# Patient Record
Sex: MALE | Race: BLACK OR AFRICAN AMERICAN | NOT HISPANIC OR LATINO | Employment: UNEMPLOYED | URBAN - METROPOLITAN AREA
[De-identification: names, ages, dates, MRNs, and addresses within clinical notes are randomized per-mention and may not be internally consistent; named-entity substitution may affect disease eponyms.]

---

## 2018-12-31 ENCOUNTER — HOSPITAL ENCOUNTER (EMERGENCY)
Facility: HOSPITAL | Age: 33
Discharge: HOME | End: 2018-12-31
Attending: FAMILY MEDICINE

## 2018-12-31 VITALS
TEMPERATURE: 99 F | OXYGEN SATURATION: 100 % | BODY MASS INDEX: 22.07 KG/M2 | RESPIRATION RATE: 15 BRPM | SYSTOLIC BLOOD PRESSURE: 116 MMHG | WEIGHT: 143 LBS | HEART RATE: 67 BPM | DIASTOLIC BLOOD PRESSURE: 81 MMHG

## 2018-12-31 DIAGNOSIS — R51.9 INTRACTABLE HEADACHE, UNSPECIFIED CHRONICITY PATTERN, UNSPECIFIED HEADACHE TYPE: Primary | ICD-10-CM

## 2018-12-31 DIAGNOSIS — E86.0 MILD DEHYDRATION: ICD-10-CM

## 2018-12-31 LAB
ALBUMIN SERPL BCP-MCNC: 3.8 G/DL
ALP SERPL-CCNC: 54 U/L
ALT SERPL W/O P-5'-P-CCNC: 13 U/L
ANION GAP SERPL CALC-SCNC: 8 MMOL/L
AST SERPL-CCNC: 22 U/L
BACTERIA #/AREA URNS AUTO: ABNORMAL /HPF
BASOPHILS # BLD AUTO: 0.01 K/UL
BASOPHILS NFR BLD: 0.2 %
BILIRUB SERPL-MCNC: 0.9 MG/DL
BILIRUB UR QL STRIP: NEGATIVE
BUN SERPL-MCNC: 11 MG/DL
CALCIUM SERPL-MCNC: 8.9 MG/DL
CHLORIDE SERPL-SCNC: 100 MMOL/L
CLARITY UR REFRACT.AUTO: CLEAR
CO2 SERPL-SCNC: 31 MMOL/L
COLOR UR AUTO: YELLOW
CREAT SERPL-MCNC: 1.32 MG/DL
DIFFERENTIAL METHOD: ABNORMAL
EOSINOPHIL # BLD AUTO: 0 K/UL
EOSINOPHIL NFR BLD: 0.2 %
ERYTHROCYTE [DISTWIDTH] IN BLOOD BY AUTOMATED COUNT: 14.8 %
EST. GFR  (AFRICAN AMERICAN): >60 ML/MIN/1.73 M^2
EST. GFR  (NON AFRICAN AMERICAN): >60 ML/MIN/1.73 M^2
FLUAV AG SPEC QL IA: NEGATIVE
FLUBV AG SPEC QL IA: NEGATIVE
GLUCOSE SERPL-MCNC: 105 MG/DL
GLUCOSE UR QL STRIP: NEGATIVE
HCT VFR BLD AUTO: 39.1 %
HGB BLD-MCNC: 14 G/DL
HGB UR QL STRIP: ABNORMAL
HYALINE CASTS UR QL AUTO: 2 /LPF
KETONES UR QL STRIP: NEGATIVE
LEUKOCYTE ESTERASE UR QL STRIP: NEGATIVE
LYMPHOCYTES # BLD AUTO: 2 K/UL
LYMPHOCYTES NFR BLD: 40.4 %
MCH RBC QN AUTO: 27.3 PG
MCHC RBC AUTO-ENTMCNC: 35.8 G/DL
MCV RBC AUTO: 76 FL
MICROSCOPIC COMMENT: ABNORMAL
MONOCYTES # BLD AUTO: 0.9 K/UL
MONOCYTES NFR BLD: 17.2 %
NEUTROPHILS # BLD AUTO: 2.1 K/UL
NEUTROPHILS NFR BLD: 41.8 %
NITRITE UR QL STRIP: NEGATIVE
PH UR STRIP: 6 [PH] (ref 5–8)
PLATELET # BLD AUTO: 198 K/UL
PMV BLD AUTO: 9.1 FL
POCT GLUCOSE: 127 MG/DL (ref 70–110)
POTASSIUM SERPL-SCNC: 3.5 MMOL/L
PROT SERPL-MCNC: 7.2 G/DL
PROT UR QL STRIP: ABNORMAL
RBC # BLD AUTO: 5.13 M/UL
RBC #/AREA URNS AUTO: 3 /HPF (ref 0–4)
SODIUM SERPL-SCNC: 139 MMOL/L
SP GR UR STRIP: 1.02 (ref 1–1.03)
SPECIMEN SOURCE: NORMAL
URN SPEC COLLECT METH UR: ABNORMAL
UROBILINOGEN UR STRIP-ACNC: NEGATIVE EU/DL
WBC # BLD AUTO: 4.95 K/UL
WBC #/AREA URNS AUTO: 3 /HPF (ref 0–5)

## 2018-12-31 PROCEDURE — 81000 URINALYSIS NONAUTO W/SCOPE: CPT

## 2018-12-31 PROCEDURE — 63600175 PHARM REV CODE 636 W HCPCS: Performed by: PHYSICIAN ASSISTANT

## 2018-12-31 PROCEDURE — 85025 COMPLETE CBC W/AUTO DIFF WBC: CPT

## 2018-12-31 PROCEDURE — 96365 THER/PROPH/DIAG IV INF INIT: CPT

## 2018-12-31 PROCEDURE — 82962 GLUCOSE BLOOD TEST: CPT

## 2018-12-31 PROCEDURE — 96361 HYDRATE IV INFUSION ADD-ON: CPT

## 2018-12-31 PROCEDURE — 87400 INFLUENZA A/B EACH AG IA: CPT

## 2018-12-31 PROCEDURE — 99285 EMERGENCY DEPT VISIT HI MDM: CPT | Mod: 25

## 2018-12-31 PROCEDURE — 80053 COMPREHEN METABOLIC PANEL: CPT

## 2018-12-31 PROCEDURE — 96375 TX/PRO/DX INJ NEW DRUG ADDON: CPT

## 2018-12-31 PROCEDURE — 25000003 PHARM REV CODE 250: Performed by: PHYSICIAN ASSISTANT

## 2018-12-31 RX ORDER — DIPHENHYDRAMINE HYDROCHLORIDE 50 MG/ML
25 INJECTION INTRAMUSCULAR; INTRAVENOUS
Status: COMPLETED | OUTPATIENT
Start: 2018-12-31 | End: 2018-12-31

## 2018-12-31 RX ORDER — KETOROLAC TROMETHAMINE 30 MG/ML
15 INJECTION, SOLUTION INTRAMUSCULAR; INTRAVENOUS
Status: COMPLETED | OUTPATIENT
Start: 2018-12-31 | End: 2018-12-31

## 2018-12-31 RX ORDER — BUTALBITAL, ACETAMINOPHEN AND CAFFEINE 50; 325; 40 MG/1; MG/1; MG/1
1 TABLET ORAL EVERY 6 HOURS PRN
Qty: 5 TABLET | Refills: 0 | Status: SHIPPED | OUTPATIENT
Start: 2018-12-31 | End: 2019-01-30

## 2018-12-31 RX ORDER — CETIRIZINE HYDROCHLORIDE 10 MG/1
10 TABLET ORAL DAILY
Qty: 30 TABLET | Refills: 0 | Status: SHIPPED | OUTPATIENT
Start: 2018-12-31 | End: 2019-12-31

## 2018-12-31 RX ADMIN — DIPHENHYDRAMINE HYDROCHLORIDE 25 MG: 50 INJECTION, SOLUTION INTRAMUSCULAR; INTRAVENOUS at 02:12

## 2018-12-31 RX ADMIN — KETOROLAC TROMETHAMINE 15 MG: 30 INJECTION, SOLUTION INTRAMUSCULAR at 02:12

## 2018-12-31 RX ADMIN — PROMETHAZINE HYDROCHLORIDE 12.5 MG: 25 INJECTION INTRAMUSCULAR; INTRAVENOUS at 02:12

## 2018-12-31 RX ADMIN — SODIUM CHLORIDE 1000 ML: 0.9 INJECTION, SOLUTION INTRAVENOUS at 02:12

## 2018-12-31 NOTE — ED NOTES
Pt stated she is starting to feel better, feel a little woozy from the medication. Will continue to monitor.

## 2018-12-31 NOTE — ED NOTES
"Severe headache,   " I think I may be dehydrated." pt has dark colored yellow urine and is not able to keep his eyes open at this time due to severe headache.  "

## 2019-01-01 NOTE — ED PROVIDER NOTES
Encounter Date: 2018       History     Chief Complaint   Patient presents with    Headache     HA, Weakness, and back pain that atsrated 2 days ago. I thought maybe it was something i ate bc the pasta didnt spell right i ate vandana day before that on Friday. No Vomiting or diarrha. I have had viral menigitis twice as a baby and as an 10 year old.     Back Pain    Weakness     Chinedu George 33 y.o. afebrile male with history of viral meningitis as a child however otherwise presented to the ED with c/o general illness complaints. Patient reports that he was around multiple people Friday as he had to attend a .  He states Saturday he began with some malaise, generalized weakness and gradual onset of headache. He does report that headache is located primarily to the right frontal in orbital regions.  He describes it as a pressure sensation.  He does also report associated nasal congestion and sinus pressure.  He states since this time he has had onset of generalized body aches with prevalence to the entire back.  Patient denies any recent falls or trauma. He states that he has had some decreased appetite and hence has had decreased p.o. intake including liquids.  He reports that he feels somewhat dehydrated and is then did some yellowing of the urine.  He denies any fever, vision changes, neck rigidity, cough, chest pain, vomiting, diarrhea, rash or  symptoms. He has not tried any medications for the symptoms.       The history is provided by the patient.     Review of patient's allergies indicates:  No Known Allergies  Past Medical History:   Diagnosis Date    Viral meningitis      History reviewed. No pertinent surgical history.  History reviewed. No pertinent family history.  Social History     Tobacco Use    Smoking status: Light Tobacco Smoker     Packs/day: 0.25     Types: Cigarettes    Smokeless tobacco: Never Used   Substance Use Topics    Alcohol use: Yes     Comment: socially     Drug use:  No     Review of Systems   Constitutional: Positive for appetite change. Negative for fever.   HENT: Positive for congestion, sinus pressure and sinus pain. Negative for sore throat.    Eyes: Negative for photophobia and visual disturbance.   Respiratory: Negative for shortness of breath.    Cardiovascular: Negative for chest pain.   Gastrointestinal: Negative for abdominal pain, diarrhea, nausea and vomiting.   Genitourinary: Negative for discharge, dysuria, flank pain, frequency, hematuria, penile pain, penile swelling, scrotal swelling and testicular pain.   Musculoskeletal: Positive for arthralgias (generalized body aches) and back pain. Negative for neck pain and neck stiffness.   Skin: Negative for rash.   Neurological: Positive for weakness and headaches. Negative for dizziness and light-headedness.   Hematological: Does not bruise/bleed easily.   Psychiatric/Behavioral: Negative for confusion.       Physical Exam     Initial Vitals [12/31/18 1401]   BP Pulse Resp Temp SpO2   123/88 93 15 98.7 °F (37.1 °C) 98 %      MAP       --         Vitals:    12/31/18 1614   BP: 116/81   Pulse: 67   Resp:    Temp:        Physical Exam    Nursing note and vitals reviewed.  Constitutional: Vital signs are normal. He appears well-developed and well-nourished. He is cooperative.  Non-toxic appearance. He does not appear ill. No distress.   HENT:   Head: Normocephalic and atraumatic.   Nose: Mucosal edema present. Right sinus exhibits maxillary sinus tenderness and frontal sinus tenderness. Left sinus exhibits maxillary sinus tenderness and frontal sinus tenderness.   Mouth/Throat: Mucous membranes are dry.   Eyes: Conjunctivae and lids are normal.   Neck: Trachea normal, normal range of motion and full passive range of motion without pain. Neck supple. No stridor present. No spinous process tenderness and no muscular tenderness present. No Kernig's sign noted.   Cardiovascular: Normal rate and regular rhythm.    Pulmonary/Chest: Breath sounds normal. No respiratory distress. He has no wheezes. He has no rhonchi.   Abdominal: Soft. Normal appearance. There is no tenderness. There is no rebound and no guarding.   Musculoskeletal: Normal range of motion. He exhibits no edema or tenderness.   Lymphadenopathy:     He has no cervical adenopathy.   Neurological: He is alert and oriented to person, place, and time. He has normal strength. No cranial nerve deficit or sensory deficit. Gait normal. GCS eye subscore is 4. GCS verbal subscore is 5. GCS motor subscore is 6.   Skin: Skin is warm, dry and intact. No rash noted.   Psychiatric: He has a normal mood and affect. His speech is normal and behavior is normal. Thought content normal.         ED Course   Procedures  Labs Reviewed   CBC W/ AUTO DIFFERENTIAL - Abnormal; Notable for the following components:       Result Value    Hematocrit 39.1 (*)     MCV 76 (*)     RDW 14.8 (*)     MPV 9.1 (*)     Mono% 17.2 (*)     All other components within normal limits   COMPREHENSIVE METABOLIC PANEL - Abnormal; Notable for the following components:    CO2 31 (*)     All other components within normal limits   URINALYSIS, REFLEX TO URINE CULTURE - Abnormal; Notable for the following components:    Protein, UA 2+ (*)     Occult Blood UA 2+ (*)     All other components within normal limits    Narrative:     Preferred Collection Type->Urine, Clean Catch   URINALYSIS MICROSCOPIC - Abnormal; Notable for the following components:    Hyaline Casts, UA 2 (*)     All other components within normal limits    Narrative:     Preferred Collection Type->Urine, Clean Catch   POCT GLUCOSE - Abnormal; Notable for the following components:    POCT Glucose 127 (*)     All other components within normal limits   INFLUENZA A AND B ANTIGEN          Imaging Results    None     Chinedu George 33 y.o. afebrile male with history of viral meningitis as a child however otherwise presented to the ED with c/o general  illness complaints. Patient reports that he was around multiple people Friday as he had to attend a .  He states Saturday he began with some malaise, generalized weakness and gradual onset of headache. He does report that headache is located primarily to the right frontal in orbital regions.  He describes it as a pressure sensation.  He does also report associated nasal congestion and sinus pressure.  He states since this time he has had onset of generalized body aches with prevalence to the entire back.  Patient denies any recent falls or trauma. He states that he has had some decreased appetite and hence has had decreased p.o. intake including liquids.  He reports that he feels somewhat dehydrated and is then did some yellowing of the urine.  He denies any fever, vision changes, neck rigidity, cough, chest pain, vomiting, diarrhea, rash or  symptoms. He has not tried any medications for the symptoms.  ROS positive for URI symptoms.  Physical exam reveals patient that appears nontoxic in mild distress.  TM's with bilateral serous otitis media; nose with congestion and rhinorrhea. TTP over bilateral frontal sinuses. Oropharynx with mild erythema; no edema or exudate. PERRL, EOMs intact. Lungs clear and free of wheeze. Heart regular rate and rhythm. Abdomen is soft and nontender with normal bowel sounds. FROM of neck, no lymphadenopathy or meningeal signs and FROM of all extremities with strength 5/5 bilaterally. Skin free of rash, pallor and diaphoresis.    DDX: influenza, viral URI with cough, , UTI, electrolyte abnormality, anemia, dehydration, sinus headache, tension headache, meningitis    ED management: Flu swab negative. Patient's orthostatics were noted to be negative however pulse did change by 20 points consistent with some mild dehydration.  We will obtain labs to evaluate for general symptoms. Labs are grossly unremarkable.  No further labs or imaging warranted at this time as patient remains  well appearing, afebrile and in no distress at this time.  Patient did report complete resolution headache with IV fluids, Toradol, Phenergan and Benadryl in the ED.  Low suspicion of acute intracranial process or meningitis as Patient has no neck stiffness/meningismus, fever, elevated WBC count, elevated blood pressure, confusion, vision loss, temporal artery tenderness, rash, vomiting, photophobia or thunderclap onset to suggest pseudotumor cerebri, meningitis, tumor, ICH, temporal arteritis, or encephalitis.  Did encourage patient to increase oral hydration and to begin medications as directed as there does appear to be a component of sinus headache and believe patient's symptoms are more consistent with viral syndrome.       Impression/Plan: The primary encounter diagnosis was Intractable headache, unspecified chronicity pattern, unspecified headache type. A diagnosis of Mild dehydration was also pertinent to this visit. Discharged with Fioricet and Zyrtec. Patient will follow up with Primary.  Patient cautioned on when to return to ED including of any development of or worsening headache, fever or neck rigidity.  Pt. Understands and agrees with current treatment plan                                                 Clinical Impression:   The primary encounter diagnosis was Intractable headache, unspecified chronicity pattern, unspecified headache type. A diagnosis of Mild dehydration was also pertinent to this visit.                             SUGAR Moran  12/31/18 2034

## 2022-08-15 ENCOUNTER — HOSPITAL ENCOUNTER (EMERGENCY)
Facility: OTHER | Age: 37
Discharge: HOME | End: 2022-08-15
Attending: EMERGENCY MEDICINE

## 2022-08-15 VITALS
DIASTOLIC BLOOD PRESSURE: 95 MMHG | RESPIRATION RATE: 18 BRPM | HEIGHT: 68 IN | HEART RATE: 67 BPM | SYSTOLIC BLOOD PRESSURE: 163 MMHG | WEIGHT: 143 LBS | OXYGEN SATURATION: 98 % | BODY MASS INDEX: 21.67 KG/M2 | TEMPERATURE: 98 F

## 2022-08-15 DIAGNOSIS — R60.0 FACIAL EDEMA: Primary | ICD-10-CM

## 2022-08-15 DIAGNOSIS — K04.7 DENTAL INFECTION: ICD-10-CM

## 2022-08-15 PROCEDURE — 25000003 PHARM REV CODE 250: Performed by: EMERGENCY MEDICINE

## 2022-08-15 PROCEDURE — 99283 EMERGENCY DEPT VISIT LOW MDM: CPT

## 2022-08-15 RX ORDER — KETOROLAC TROMETHAMINE 10 MG/1
10 TABLET, FILM COATED ORAL
Status: COMPLETED | OUTPATIENT
Start: 2022-08-15 | End: 2022-08-15

## 2022-08-15 RX ORDER — OXYCODONE AND ACETAMINOPHEN 5; 325 MG/1; MG/1
1 TABLET ORAL EVERY 8 HOURS PRN
Qty: 6 TABLET | Refills: 0 | Status: SHIPPED | OUTPATIENT
Start: 2022-08-15 | End: 2022-08-17

## 2022-08-15 RX ADMIN — KETOROLAC TROMETHAMINE 10 MG: 10 TABLET, FILM COATED ORAL at 10:08

## 2022-08-16 NOTE — ED PROVIDER NOTES
Encounter Date: 8/15/2022    SCRIBE #1 NOTE: I, Radha Vieyra, am scribing for, and in the presence of, Elysia Evans MD.       History     Chief Complaint   Patient presents with    Dental Pain     Toothache since Friday night.    Oral Swelling     Pt does present with swelling noted to the right side of face.  Denies any sob and is speaking in complete sentenced without taking a pause; RR17 97%.     Time seen by provider: 9:25 PM    This is a 37 y.o. male with no significant PMHx, who presents with complaint of right upper dental pain that began 3 days ago. Pt also notes facial swelling. He was seen at [facility] 1 day ago and was prescribed Norco, naproxen, and Augmentin. He has only taken 2 doses of the Augmentin, and states that he only filled the Norco prescription, which he has been taking with no relief. He denies any fever, chills, body aches or trouble swallowing.     This is the extent of the patient's complaints at this time.    The history is provided by the patient.     Review of patient's allergies indicates:  No Known Allergies  Past Medical History:   Diagnosis Date    Viral meningitis      No past surgical history on file.  No family history on file.  Social History     Tobacco Use    Smoking status: Light Tobacco Smoker     Packs/day: 0.25     Types: Cigarettes    Smokeless tobacco: Never Used   Substance Use Topics    Alcohol use: Yes     Comment: socially     Drug use: No     Types: Marijuana     Review of Systems   Constitutional: Negative for chills and fever.   HENT: Positive for dental problem and facial swelling. Negative for sore throat and trouble swallowing.    Respiratory: Negative for shortness of breath.    Cardiovascular: Negative for chest pain.   Gastrointestinal: Negative for nausea.   Genitourinary: Negative for dysuria.   Musculoskeletal: Negative for back pain and myalgias.   Skin: Negative for rash.   Neurological: Negative for weakness.   Hematological:  Does not bruise/bleed easily.   All other systems reviewed and are negative.      Physical Exam     Initial Vitals [08/15/22 2014]   BP Pulse Resp Temp SpO2   (!) 176/99 68 17 98.9 °F (37.2 °C) 97 %      MAP       --         Physical Exam    Nursing note and vitals reviewed.  Constitutional: He appears well-developed and well-nourished. He is not diaphoretic. No distress.   HENT:   Head: Normocephalic and atraumatic.   Right sided facial edema. Right sided gingival edema. No sublingual or submandibular edema. Controlling secretions. No drainable fluid collection. Poor dentition with multiple fractured molars.    Eyes: EOM are normal.   Neck: Neck supple.   Normal range of motion.  Cardiovascular: Normal rate, regular rhythm and normal heart sounds. Exam reveals no gallop and no friction rub.    No murmur heard.  Pulmonary/Chest: Breath sounds normal. No respiratory distress. He has no wheezes. He has no rhonchi. He has no rales.   Musculoskeletal:         General: No tenderness or edema. Normal range of motion.      Cervical back: Normal range of motion and neck supple.     Neurological: He is alert and oriented to person, place, and time.   Skin: Skin is warm and dry.         ED Course   Procedures  Labs Reviewed - No data to display       Imaging Results    None          Medications   ketorolac tablet 10 mg (has no administration in time range)     Medical Decision Making:   History:   Old Medical Records: I decided to obtain old medical records.    Additional MDM:   Comments: 29-year-old male presents afebrile, hemodynamically stable well-appearing right-sided facial edema.  He is currently being treated for dental infection with Augmentin.  No evidence of Andres's angina.  No drainable dental abscess on exam.  I do not feel that this is treatment failure as he only has had 2 doses of Augmentin.  He was instructed to continue taking the Augmentin he was recently prescribed.  He states Norco did not help with his  pain, therefore, he will be given a prescription for Percocet x2 days.  He was instructed to not take the Norco while he is taking the Percocet.  In addition he was also given a prescription for naproxen at the outside facility which I encouraged him to fill.  He was instructed to follow-up with dentist for definitive management..        Scribe Attestation:   Scribe #1: I performed the above scribed service and the documentation accurately describes the services I performed. I attest to the accuracy of the note.              Physician Attestation for Scribe: I, Elysia Evans  , reviewed documentation as scribed in my presence, which is both accurate and complete.      Clinical Impression:   Final diagnoses:  [R60.0] Facial edema (Primary)  [K04.7] Dental infection          ED Disposition Condition    Discharge Stable        ED Prescriptions     Medication Sig Dispense Start Date End Date Auth. Provider    oxyCODONE-acetaminophen (PERCOCET) 5-325 mg per tablet Take 1 tablet by mouth every 8 (eight) hours as needed for Pain (Severe pain). 6 tablet 8/15/2022 8/17/2022 Elysia Evans MD        Follow-up Information     Follow up With Specialties Details Why Contact Info    Dentist  Schedule an appointment as soon as possible for a visit              Elysia Evans MD  08/15/22 3450

## 2022-08-16 NOTE — DISCHARGE INSTRUCTIONS
Continue taking the Augmentin that you were prescribed at [facility].  Follow-up with a dentist with for definitive care.

## 2025-06-03 ENCOUNTER — HOSPITAL ENCOUNTER (EMERGENCY)
Facility: HOSPITAL | Age: 40
Discharge: HOME | End: 2025-06-03
Attending: EMERGENCY MEDICINE

## 2025-06-03 VITALS
SYSTOLIC BLOOD PRESSURE: 149 MMHG | RESPIRATION RATE: 18 BRPM | HEART RATE: 121 BPM | BODY MASS INDEX: 21.98 KG/M2 | HEIGHT: 68 IN | DIASTOLIC BLOOD PRESSURE: 105 MMHG | OXYGEN SATURATION: 94 % | TEMPERATURE: 100 F | WEIGHT: 145 LBS

## 2025-06-03 DIAGNOSIS — N50.811 PAIN IN RIGHT TESTICLE: ICD-10-CM

## 2025-06-03 DIAGNOSIS — N34.2 URETHRITIS: ICD-10-CM

## 2025-06-03 DIAGNOSIS — N45.1 EPIDIDYMITIS: Primary | ICD-10-CM

## 2025-06-03 LAB
BACTERIA #/AREA URNS HPF: ABNORMAL /HPF
BILIRUB UR QL STRIP.AUTO: NEGATIVE
CLARITY UR: ABNORMAL
COLOR UR AUTO: YELLOW
GLUCOSE UR QL STRIP: ABNORMAL
GRAN CASTS #/AREA URNS LPF: 2 /LPF (ref ?–0)
HGB UR QL STRIP: ABNORMAL
HOLD SPECIMEN: NORMAL
HYALINE CASTS #/AREA URNS LPF: 3 /LPF (ref 0–1)
KETONES UR QL STRIP: ABNORMAL
LEUKOCYTE ESTERASE UR QL STRIP: ABNORMAL
MICROSCOPIC COMMENT: ABNORMAL
NITRITE UR QL STRIP: NEGATIVE
PH UR STRIP: 6 [PH]
PROT UR QL STRIP: ABNORMAL
RBC #/AREA URNS HPF: 20 /HPF (ref 0–4)
SP GR UR STRIP: 1.02
UROBILINOGEN UR STRIP-ACNC: NEGATIVE EU/DL
WBC #/AREA URNS HPF: >100 /HPF (ref 0–5)
WBC CLUMPS URNS QL MICRO: ABNORMAL

## 2025-06-03 PROCEDURE — 63600175 PHARM REV CODE 636 W HCPCS: Mod: ER | Performed by: PHYSICIAN ASSISTANT

## 2025-06-03 PROCEDURE — 87491 CHLMYD TRACH DNA AMP PROBE: CPT | Mod: ER | Performed by: PHYSICIAN ASSISTANT

## 2025-06-03 PROCEDURE — 87086 URINE CULTURE/COLONY COUNT: CPT | Mod: ER | Performed by: PHYSICIAN ASSISTANT

## 2025-06-03 PROCEDURE — 99285 EMERGENCY DEPT VISIT HI MDM: CPT | Mod: 25,ER

## 2025-06-03 PROCEDURE — 81003 URINALYSIS AUTO W/O SCOPE: CPT | Mod: ER | Performed by: PHYSICIAN ASSISTANT

## 2025-06-03 PROCEDURE — 96372 THER/PROPH/DIAG INJ SC/IM: CPT | Performed by: PHYSICIAN ASSISTANT

## 2025-06-03 RX ORDER — KETOROLAC TROMETHAMINE 10 MG/1
10 TABLET, FILM COATED ORAL EVERY 8 HOURS PRN
Qty: 9 TABLET | Refills: 0 | Status: SHIPPED | OUTPATIENT
Start: 2025-06-03 | End: 2025-06-06

## 2025-06-03 RX ORDER — CEFTRIAXONE 500 MG/1
500 INJECTION, POWDER, FOR SOLUTION INTRAMUSCULAR; INTRAVENOUS
Status: COMPLETED | OUTPATIENT
Start: 2025-06-03 | End: 2025-06-03

## 2025-06-03 RX ORDER — KETOROLAC TROMETHAMINE 30 MG/ML
10 INJECTION, SOLUTION INTRAMUSCULAR; INTRAVENOUS
Status: COMPLETED | OUTPATIENT
Start: 2025-06-03 | End: 2025-06-03

## 2025-06-03 RX ORDER — DOXYCYCLINE 100 MG/1
100 CAPSULE ORAL 2 TIMES DAILY
Qty: 20 CAPSULE | Refills: 0 | Status: SHIPPED | OUTPATIENT
Start: 2025-06-03 | End: 2025-06-13

## 2025-06-03 RX ADMIN — KETOROLAC TROMETHAMINE 9.9 MG: 30 INJECTION, SOLUTION INTRAMUSCULAR; INTRAVENOUS at 05:06

## 2025-06-03 RX ADMIN — CEFTRIAXONE SODIUM 500 MG: 500 INJECTION, POWDER, FOR SOLUTION INTRAMUSCULAR; INTRAVENOUS at 04:06

## 2025-06-03 NOTE — Clinical Note
"Chinedu "Chinedu"Ariel was seen and treated in our emergency department on 6/3/2025.  He may return to work on 06/07/2025.  Please excuse from work 6/1/25 and then 6/3/25-6/7/25. May return sooner if symptoms resolve completely     If you have any questions or concerns, please don't hesitate to call.      Christen Reeves PA"

## 2025-06-03 NOTE — DISCHARGE INSTRUCTIONS
Do not have sex until you have completed treatment and have all test results. Follow up with primary care for complete STD testing including HIV. Return to the ED if worse in any way.

## 2025-06-03 NOTE — LETTER
June 8, 2025    Chinedu George  [address] [place] [address]                   [address] [facility]  EMERGENCY DEPARTMENT  [facility] [address]  Phone: [phone]  Fax: [phone]  Dear, Mr. George,       After several attempts, we are unable to reach you by phone.  We would like to follow up with the regarding recent test results performed here at our ED. Please contact me at the number listed below.     Sincerely,     KRISTIAN Herrera: [phone]   [facility]: [phone]

## 2025-06-04 ENCOUNTER — RESULTS FOLLOW-UP (OUTPATIENT)
Dept: EMERGENCY MEDICINE | Facility: HOSPITAL | Age: 40
End: 2025-06-04

## 2025-06-04 LAB
C TRACH DNA SPEC QL NAA+PROBE: NOT DETECTED
CTGC SOURCE (OHS) ORD-325: ABNORMAL
N GONORRHOEA DNA UR QL NAA+PROBE: DETECTED

## 2025-06-04 NOTE — ED PROVIDER NOTES
Encounter Date: 6/3/2025       History     Chief Complaint   Patient presents with    Testicle Pain     Pt C/O R testicle pain with radiation to groin and low back X 3 days.  Pt reports penile discharge X 1 week.       Patient is a 39-year-old male presenting with complaint of constant severe swelling and pain to the right testicle.  The pain was gradual in onset over the past 2 days.  He has had some penile discharge and dysuria.  He reports a condom breaking while having intercourse recently.    The history is provided by the patient.     Review of patient's allergies indicates:  No Known Allergies  Past Medical History:   Diagnosis Date    Viral meningitis      History reviewed. No pertinent surgical history.  No family history on file.  Social History[1]  Review of Systems   Constitutional:  Negative for appetite change, chills, fatigue and fever.   HENT:  Negative for congestion, ear pain, rhinorrhea, sinus pressure and sore throat.    Respiratory:  Negative for cough, shortness of breath and wheezing.    Cardiovascular:  Negative for chest pain and palpitations.   Gastrointestinal:  Negative for abdominal pain, blood in stool, constipation, diarrhea, nausea and vomiting.   Genitourinary:  Positive for penile discharge and testicular pain. Negative for dysuria, frequency and hematuria.   Skin:  Negative for rash.   Neurological:  Negative for dizziness, weakness and numbness.   All other systems reviewed and are negative.      Physical Exam     Initial Vitals [06/03/25 1520]   BP Pulse Resp Temp SpO2   (!) 149/105 (!) 121 18 100.1 °F (37.8 °C) (!) 94 %      MAP       --         Physical Exam    Nursing note and vitals reviewed.  Constitutional: He appears well-developed and well-nourished. He appears distressed.   HENT:   Head: Normocephalic and atraumatic.   Eyes: Conjunctivae and EOM are normal.   Neck: Neck supple.   Normal range of motion.  Cardiovascular:  Normal rate, regular rhythm, normal heart sounds  and intact distal pulses.           Pulmonary/Chest: Breath sounds normal. No respiratory distress.   Abdominal: Abdomen is soft. Bowel sounds are normal. There is no abdominal tenderness.   Genitourinary: Discharge found.    Genitourinary Comments: No palpable hernia     Musculoskeletal:      Cervical back: Normal range of motion and neck supple.     Lymphadenopathy:     He has no cervical adenopathy.   Neurological: He is alert and oriented to person, place, and time. He has normal strength. No sensory deficit.   Skin: Skin is warm and dry. No rash noted.   Psychiatric: He has a normal mood and affect. His behavior is normal. Judgment and thought content normal.         ED Course   Procedures  Labs Reviewed   URINALYSIS, REFLEX TO URINE CULTURE - Abnormal       Result Value    Color, UA Yellow      Appearance, UA Cloudy (*)     pH, UA 6.0      Spec Grav UA 1.025      Protein, UA 3+ (*)     Glucose, UA Trace (*)     Ketones, UA 1+ (*)     Bilirubin, UA Negative      Blood, UA 2+ (*)     Nitrites, UA Negative      Urobilinogen, UA Negative      Leukocyte Esterase, UA Trace (*)    URINALYSIS MICROSCOPIC - Abnormal    RBC, UA 20 (*)     WBC, UA >100 (*)     WBC Clumps, UA Few (*)     Bacteria, UA Occasional      Hyaline Casts, UA 3 (*)     Granular Casts 2 (*)     Microscopic Comment       CULTURE, URINE   GREY TOP URINE HOLD    Extra Tube Hold for add-ons.     C. TRACHOMATIS/N. GONORRHOEAE BY AMP DNA          Imaging Results              US Scrotum And Testicles (Final result)  Result time 06/03/25 16:22:05      Final result by Robbie Ko DO (06/03/25 16:22:05)                   Impression:     Right epididymitis.      Finalized on: 6/3/2025 4:22 PM By:  Robbie Ko  [place] [facility]# [id number]      2025-06-03 16:24:13.900     [place] [facility]               Narrative:    EXAM: US SCROTUM AND TESTICLES    CLINICAL HISTORY:   Right testicular pain    TECHNIQUE: Routine testicular ultrasound using color and spectral  Doppler.    FINDINGS:    Right testicle: 3.9 x 2.8 x 3.3 cm.  No testicular masses.  Normal arterial and vascular flow.    Left testicle: 4.4 x 2.2 x 3.1 cm.  No testicular masses.  Normal arterial and vascular flow.    Epididymis: Edematous epididymis on the right with increased vascularity consistent with epididymitis.  Epididymis on the left is unremarkable with the exception of a  4 mm epididymal head cyst on the left.    Hydroceles:  None    Varicoceles: None    Scrotal skin: Unremarkable                                         Medications   cefTRIAXone injection 500 mg (500 mg Intramuscular Given 6/3/25 1617)   ketorolac injection 9.9 mg (9.9 mg Intramuscular Given 6/3/25 1721)     Medical Decision Making  Differential including but not limited to torsion, epididymitis, malignancy, STD, hernia    Amount and/or Complexity of Data Reviewed  Labs: ordered.     Details: Urinalysis positive for bacteria, WBC in clumps, no nitrites.  GC and chlamydia are pending.  Radiology: ordered.     Details: No evidence of torsion on ultrasound.  Ultrasound consistent with epididymitis per my review of the radiologist's report.    Risk  Prescription drug management.  Risk Details: Patient treated prophylactically for gonorrhea and chlamydia with IM Rocephin and doxycycline.  Also given a prescription for Flagyl.  Follow-up with PCP for complete STD testing.  Return to the ED if worse in any way.                                      Clinical Impression:  Final diagnoses:  [N50.811] Pain in right testicle  [N45.1] Epididymitis (Primary)  [N34.2] Urethritis          ED Disposition Condition    Discharge Stable          ED Prescriptions       Medication Sig Dispense Start Date End Date Auth. Provider    ketorolac (TORADOL) 10 mg tablet Take 1 tablet (10 mg total) by mouth every 8 (eight) hours as needed for Pain. 9 tablet 6/3/2025 6/6/2025 Christen Reeves PA    doxycycline (VIBRAMYCIN) 100 MG Cap Take 1 capsule (100 mg total)  by mouth 2 (two) times daily. for 10 days 20 capsule 6/3/2025 6/13/2025 Christen Reeves PA          Follow-up Information    None                [1]   Social History  Tobacco Use    Smoking status: Light Smoker     Current packs/day: 0.25     Types: Cigarettes    Smokeless tobacco: Never   Substance Use Topics    Alcohol use: Yes     Comment: socially     Drug use: No     Types: Marijuana        Christen Reeves PA  06/04/25 1923

## 2025-06-05 LAB — BACTERIA UR CULT: NO GROWTH
